# Patient Record
(demographics unavailable — no encounter records)

---

## 2025-04-24 NOTE — PLAN
[FreeTextEntry1] : Routine blood work drawn in office and urine collected today. ECG normal. STD panel added. Lyme test. Refer to ID and neurology. Pt advised to sign up for [facility] portal to review labs and communicate any questions or concerns directly. Yearly physical and return as needed for illness, medication refills, and new or existing complaints.

## 2025-04-24 NOTE — HEALTH RISK ASSESSMENT
[Good] : ~his/her~  mood as  good [Yes] : Yes [Monthly or less (1 pt)] : Monthly or less (1 point) [1 or 2 (0 pts)] : 1 or 2 (0 points) [Never (0 pts)] : Never (0 points) [No] : In the past 12 months have you used drugs other than those required for medical reasons? No [0] : 2) Feeling down, depressed, or hopeless: Not at all (0) [PHQ-2 Negative - No further assessment needed] : PHQ-2 Negative - No further assessment needed [[id number]] : 0 [Audit-CScore] : 1 [HIV Test offered] : HIV Test offered [Hepatitis C test offered] : Hepatitis C test offered [None] : None [With Family] : lives with family [Employed] : employed [] :  [Never] : Never

## 2025-04-24 NOTE — HISTORY OF PRESENT ILLNESS
[de-identified] : 39 year old M with PMH pre-DM presents for establish care and initial CPE. He has recent Bell's Palsy dx. He has had issues with recurrent skin lesion of his nose. Pt denies CP/SOB, fever/chills, n/v/d/c.

## 2025-05-21 NOTE — REVIEW OF SYSTEMS
[As Noted in HPI] : as noted in HPI [Negative] : Integumentary [FreeTextEntry4] : Crusted nasal lesions [de-identified] : Only in nares

## 2025-05-21 NOTE — CONSULT LETTER
[Dear  ___] : Dear  [unfilled], [Consult Letter:] : I had the pleasure of evaluating your patient, [unfilled]. [Please see my note below.] : Please see my note below. [Consult Closing:] : Thank you very much for allowing me to participate in the care of this patient.  If you have any questions, please do not hesitate to contact me. [Sincerely,] : Sincerely, [FreeTextEntry3] : Preet Farrar MD FACP Diplomates American Board of Internal Medicine and Infectious Diseases [facility] [place]

## 2025-05-21 NOTE — REVIEW OF SYSTEMS
[As Noted in HPI] : as noted in HPI [Negative] : Integumentary [FreeTextEntry4] : Crusted nasal lesions [de-identified] : Only in nares

## 2025-05-21 NOTE — PHYSICAL EXAM
[General Appearance - Alert] : alert [General Appearance - In No Acute Distress] : in no acute distress [Sclera] : the sclera and conjunctiva were normal [PERRL With Normal Accommodation] : pupils were equal in size, round, reactive to light [Extraocular Movements] : extraocular movements were intact [Outer Ear] : the ears and nose were normal in appearance [Oropharynx] : the oropharynx was normal with no thrush [FreeTextEntry1] : Residual Bell's palsy [Neck Appearance] : the appearance of the neck was normal [Neck Cervical Mass (___cm)] : no neck mass was observed [Jugular Venous Distention Increased] : there was no jugular-venous distention [Thyroid Diffuse Enlargement] : the thyroid was not enlarged [] : no respiratory distress [Auscultation Breath Sounds / Voice Sounds] : lungs were clear to auscultation bilaterally [Heart Rate And Rhythm] : heart rate was normal and rhythm regular [Heart Sounds] : normal S1 and S2 [Heart Sounds Gallop] : no gallops [Murmurs] : no murmurs [Heart Sounds Pericardial Friction Rub] : no pericardial rub [Full Pulse] : the pedal pulses are present [Edema] : there was no peripheral edema [No Palpable Adenopathy] : no palpable adenopathy [Musculoskeletal - Swelling] : no joint swelling [Nail Clubbing] : no clubbing  or cyanosis of the fingernails [Motor Tone] : muscle strength and tone were normal [Deep Tendon Reflexes (DTR)] : deep tendon reflexes were 2+ and symmetric [Sensation] : the sensory exam was normal to light touch and pinprick [No Focal Deficits] : no focal deficits

## 2025-05-21 NOTE — CONSULT LETTER
[Dear  ___] : Dear  [unfilled], [Consult Letter:] : I had the pleasure of evaluating your patient, [unfilled]. [Please see my note below.] : Please see my note below. [Consult Closing:] : Thank you very much for allowing me to participate in the care of this patient.  If you have any questions, please do not hesitate to contact me. [Sincerely,] : Sincerely, [FreeTextEntry3] : Preet Farrar MD FACP Diplomates American Board of Internal Medicine and Infectious Diseases [facility] [place]

## 2025-05-21 NOTE — HISTORY OF PRESENT ILLNESS
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  No lesions elsewhere.  Did have Bell's palsy and treated with Valtrex and steroids recently no family history of any cutaneous skin lesions or infections

## 2025-05-21 NOTE — ASSESSMENT
[FreeTextEntry1] : Patient has 1 of 2 processes going on.  This could either be periodic nasal infection due to his history of pulling nasal hairs.  We discussed hygiene and stopping the habit.  The other possibility is recurrent herpes infection of the nares especially in light of his recent Bell's palsy.  I discussed with him at the next episode to contact me and I will send a prescription for empiric Valtrex which was put in the chart.  I would start that at the first sign of a nasal lesion and try to make a follow-up appointment to see me.  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